# Patient Record
(demographics unavailable — no encounter records)

---

## 2024-10-18 NOTE — HISTORY OF PRESENT ILLNESS
[de-identified] : 84 year old man with Aortic stenosis, hyperlipidemia, hypertension, mitral regurgitation, CHELSI (off CPAP for one year), and remote h/o provoked DVT here for evaluation of erythrocytosis. Patient was previously following with Hematology- Dr. Kecia Rm at [facility] but she retired recently. He reports he was diagnosed with erythrocytosis over 5 years ago. In EMR, SANDOVAL 2 was reported as negative. He has been undergoing phlebotomy (250cc) every 6 weeks for about 5 years. His last phlebotomy was two months ago. As far as he recalls, the physician was keeping his HGB<17. He reports he tolerates the phlebotomy well without any ill side effects. He reports that he feels a bit sluggish and has some facial flushing when HGB/HCT are elevated. He reports that he had a sleep study in the past with mixed results. He was on CPAP but stopped one year ago because it did not improve his HGB/HCT. He denies any h/o smoking, pulmonary disease or testosterone use. He denies any h/o bone marrow biopsy. He is on Aspirin 81mg po daily.  He feels good overall. He reports a good energy level and appetite without recent weight loss. Patient denies any fever/chills, recent infections, CP, SOB, abdominal pain, early satiety, n/v/d, bloody/black stools, frequent headaches, dizziness, erythromelalgia, pruritus after a hot shower, swollen glands or night sweats.    Social History:  . Has four children.  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  Born in [place] Parents are from [place].    Family History:  Mother  97yo  Father  at 57 from [place]. Had first MI at age 39  Brother  at 34yo from [place]  Brother , unsure  4 children alive and well    Healthcare Maintenance:  Primary care doctor- Dr. Chidi Taveras- last seen 2023  Last colonoscopy> 5 years ago, normal  Denies endoscopy.  Denies recent chest x-ray or CT chest  Received three doses of COVID vaccine  History of COVID illness X 2. Last illness 1 year ago.       [de-identified] : Patient declined labs other than the FS for the CBC on the way into the appointment today.  Patient HCT 51.3% today, has not required a phlebotomy since March 2024.   Patient hand not made an appointment for phlebotomy today.  previous HCT was actually normal, we were not previously aware that he would even need a phlebotomy today. Now requites to have his chemistries and lipid profile drawn with the phlebotomy.   Can order a phlebotomy for today but informed him that there is a chance that there is no time on the schedule. I could make some phone calls to see if we can have this arrange.

## 2024-10-18 NOTE — ASSESSMENT
[FreeTextEntry1] : This is an 84 year old man with Aortic stenosis, hyperlipidemia, hypertension, mitral regurgitation, CHELSI (off CPAP for one year), and remote h/o provoked DVT here for evaluation of erythrocytosis. In EMR, SANDOVAL 2 was reported as negative. Though this did not includes Exon 12-15 mutations. Patient had never had a bone marrow biopsy done for pathologic confirmation of P vera. Repeated SANDOVAL 2 V617F, Exon 12-15 mutations, MPL and CALR mutations negative. 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  HCT did still rise to 51.3% today so we can see if we can make arrangements for an add on phlebotomy.  Patient now requests that his chemistries and lipid profile be checked along with the phlebotomy.    Spent > 40 minutes in direct patient care and  addressed all questions and concerns.  >50% of this time was in direct face to face contact with patient during exam and counseling and making arrangements for the additional phlebotomy that was not previously scheduled.  .

## 2025-01-06 NOTE — ASSESSMENT
[FreeTextEntry1] : This is an 84 year old man with Aortic stenosis, hyperlipidemia, hypertension, mitral regurgitation, CHELSI (off CPAP for one year), and remote h/o provoked DVT here for evaluation of erythrocytosis. In EMR, SANDOVAL 2 was reported as negative. Though this did not includes Exon 12-15 mutations. Patient had never had a bone marrow biopsy done for pathologic confirmation of P vera. Repeated SANDOVAL 2 V617F, Exon 12-15 mutations, MPL and CALR mutations negative. 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  We had proceeded with phlebotomy at patient request, HCT has been mostly well controlled, maximal HCT most recently in the 50-51% range.  Continue monthly phlebotomy for HCT > 50%.

## 2025-01-06 NOTE — HISTORY OF PRESENT ILLNESS
[de-identified] : On December 24th, PSA increased from 5.47 to 10.5.   HCT was 48% Hg 17.3g/dl that day on 12/24.  Todays still pending.      Patient is not seeing a urologist.  Saw Dr. Santiago in 2020.

## 2025-01-06 NOTE — HISTORY OF PRESENT ILLNESS
[de-identified] : On December 24th, PSA increased from 5.47 to 10.5.   HCT was 48% Hg 17.3g/dl that day on 12/24.  Todays still pending.      Patient is not seeing a urologist.  Saw Dr. Santiago in 2020.

## 2025-01-09 NOTE — PHYSICAL EXAM
[TextEntry] : General/Constitutional: WD/ WN in NAD H: NC/AT Eyes:  PERRL, sclerae and conjunctivae normal without jaundice or xanthelasma; ENMT:normal teeth, gums and palate with no petechiae, pallor or cyanosis Neck: w/o JVD, thyromegaly or adenopathy; normal venous contour Respiratory: clear to auscultation, normal respiratory effort with no retractions or use of accessory respiratory muscles Heart: [id number], regular rate with frequent ectopy, normal S1, S2 with a 3/6 mid peaking systolic murmur at the left upper sternal border but no rubs, gallops, heaves or thrills Vascular exam: normal carotid upstrokes without carotid or abdominal bruits. 2+/2+ pulses to posterior tibialis and dorsalis pedis Abdomen: soft, nontender, bowels sounds normal without hepatomegaly or splenomegaly, masses or bruits Musculoskeletal:without significant kyphosis or scoliosis Extremities: w/o CC; 1+ left ankle edema (chronic) and trace right ankle edema, good capillary filling Skin: no stasis changes; no ulcers  Neuro: AA and O x 3; no focal neurologic deficits Psych: normal mood and affect

## 2025-01-09 NOTE — REVIEW OF SYSTEMS
[TextEntry] : Except as noted above... Constitutional: The patient denied headache, fatigue, fever, sweats, loss of appetite or chills Eyes: The patient denied double vision, eye pain, eye discharge, red eyes or itchy eyes ENT: The patient denied ear pain, ear discharge, nasal congestion, nasal discharge, sore throat, enlarged tonsils, hoarseness, neck pain or neck swelling Cardiovascular: The patient denied  dizziness,  fainting  or leg cramps Respiratory: The patient denied shortness of breath, cough, coughing up blood, wheezing, chest congestion or mucous production GI: The patient denied weight gain, weight loss, abdominal pain, nausea, vomiting, diarrhea, constipation, black stools or bloody stools : The patient denied pain on urination, burning with urination, frequent urination or blood in the urine Skin: The patient denied rashes, redness or swelling Neurologic: The patient denied headache, stiff neck, weakness, numbness, difficulty speaking, unsteadiness or numbness/tingling in feet Psychiatric: The patient denied hallucinations, agitation or disorientation Endocrine: The patient denied excessive thirst, excessive urination, cold intolerance or heat intolerance Hematologic: The patient denied easy bruisability or pallor Allergic/Immunologic: The patient denied runny nose, recurrent infections, hives or pruritis Musculoskeletal: The patient denied arthritic pains, muscle weakness or muscle aches Extremities: The patient denied clubbing, cyanosis or lower extremity swelling

## 2025-01-09 NOTE — REASON FOR VISIT
[FreeTextEntry1] : The patient is here today for follow-up of hypertension, hypercholesterolemia, aortic stenosis, bilateral lower extremity edema and a rising PSA.  The patient's most recent blood test demonstrated PSA jumped from 5 to over 10.  His free PSA is 29%.  The patient does describe symptoms of BPH (frequent urination and nocturia).  He is scheduled to see a urologist in the near future for further evaluation.  From a cardiovascular standpoint, the patient is clinically stable.

## 2025-01-09 NOTE — DISCUSSION/SUMMARY
[FreeTextEntry1] : Hypertension-well-controlled on valsartan /25 daily.  Patient will continue checking blood pressures and will get back to me. Palpitations-resolved. Rising PSA-as above.  Patient will try super beta prostate to see whether BPH symptoms can improve.  Follow-up of elevated PSA as per urology. Polycythemia vera-as per [facility]. [facility], hyperlipidemia, high risk medication (fluconazole for dermatitis)-laboratory data drawn today Return visit 4 months   Total time of the encounter: 30 minutes which included but was not limited to the following: Face-to-face and non face-to-face time personally spent by the physician preparing to see the patient, obtaining and/or resuming separately obtained history, performing a medically appropriate examination and/or evaluation, counseling and educating the patient/family/caregiver, ordering medications, tests or procedures, referring and communicating with other healthcare professionals, documenting clinical information in the electronic health record, independently interpreting results and communicated results to the patient/family/caregiver and care coordination.  [EKG obtained to assist in diagnosis and management of assessed problem(s)] : EKG obtained to assist in diagnosis and management of assessed problem(s)

## 2025-02-06 NOTE — ASSESSMENT
[FreeTextEntry1] : This is an 84 year old man with Aortic stenosis, hyperlipidemia, hypertension, mitral regurgitation, CHELSI (off CPAP for one year), and remote h/o provoked DVT here for evaluation of erythrocytosis. In EMR, SANDOVAL 2 was reported as negative. Though this did not includes Exon 12-15 mutations. Patient had never had a bone marrow biopsy done for pathologic confirmation of P vera. Repeated SANDOVAL 2 V617F, Exon 12-15 mutations, MPL and CALR mutations negative. 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  Secondary erythrocytosis. Patient HCT 49% normal. Hg 17.8g/dl is a little high but not severely elevated. Continue phlebotomy treatments Q 2 months.  We had proceeded with phlebotomy at patient request, HCT has been mostly well controlled, maximal HCT most recently in the 50-51% range.  Continue monthly phlebotomy for HCT > 50%.

## 2025-02-06 NOTE — HISTORY OF PRESENT ILLNESS
[FreeTextEntry1] : Follow elevated PSA.  PSA is 10.  MRI demonstrated PI-RADS 4 lesion.  Consider biopsy.   Please refer to URO Consult Note.

## 2025-02-06 NOTE — HISTORY OF PRESENT ILLNESS
[de-identified] : 84 year old man with Aortic stenosis, hyperlipidemia, hypertension, mitral regurgitation, CHELSI (off CPAP for one year), and remote h/o provoked DVT here for evaluation of erythrocytosis. Patient was previously following with Hematology- Dr. Kecia Rm at [facility] but she retired recently. He reports he was diagnosed with erythrocytosis over 5 years ago. In EMR, SANDOVAL 2 was reported as negative. He has been undergoing phlebotomy (250cc) every 6 weeks for about 5 years. His last phlebotomy was two months ago. As far as he recalls, the physician was keeping his HGB<17. He reports he tolerates the phlebotomy well without any ill side effects. He reports that he feels a bit sluggish and has some facial flushing when HGB/HCT are elevated. He reports that he had a sleep study in the past with mixed results. He was on CPAP but stopped one year ago because it did not improve his HGB/HCT. He denies any h/o smoking, pulmonary disease or testosterone use. He denies any h/o bone marrow biopsy. He is on Aspirin 81mg po daily.  He feels good overall. He reports a good energy level and appetite without recent weight loss. Patient denies any fever/chills, recent infections, CP, SOB, abdominal pain, early satiety, n/v/d, bloody/black stools, frequent headaches, dizziness, erythromelalgia, pruritus after a hot shower, swollen glands or night sweats.    Social History:  . Has four children.  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  Born in [place] Parents are from [place].    Family History:  Mother  99yo  Father  at 57 from [place]. Had first MI at age 39  Brother  at 32yo from [place]  Brother , unsure  4 children alive and well    Healthcare Maintenance:  Primary care doctor- Dr. Chidi Taveras- last seen 2023  Last colonoscopy> 5 years ago, normal  Denies endoscopy.  Denies recent chest x-ray or CT chest  Received three doses of COVID vaccine  History of COVID illness X 2. Last illness 1 year ago.       [de-identified] : On December 24th, PSA increased from 5.47 to 10.5.   HCT was 48% Hg 17.3g/dl that day on 12/24.  Todays still pending.   Patient is not seeing a urologist.  Saw Dr. Santiago in 2020.  Patient now seeing Dr. Smith for the elevated PSA up to 10 now. Patient had an MRI hd a Prads 4 finding, probable malignancy, has a biopsy coming up next week. HCT now 49%.  Does not require phlebotomy, has needed one perhaps every 2 months or so lately.

## 2025-02-06 NOTE — HISTORY OF PRESENT ILLNESS
[de-identified] : 84 year old man with Aortic stenosis, hyperlipidemia, hypertension, mitral regurgitation, CHELSI (off CPAP for one year), and remote h/o provoked DVT here for evaluation of erythrocytosis. Patient was previously following with Hematology- Dr. Kecia Rm at [facility] but she retired recently. He reports he was diagnosed with erythrocytosis over 5 years ago. In EMR, SANDOVAL 2 was reported as negative. He has been undergoing phlebotomy (250cc) every 6 weeks for about 5 years. His last phlebotomy was two months ago. As far as he recalls, the physician was keeping his HGB<17. He reports he tolerates the phlebotomy well without any ill side effects. He reports that he feels a bit sluggish and has some facial flushing when HGB/HCT are elevated. He reports that he had a sleep study in the past with mixed results. He was on CPAP but stopped one year ago because it did not improve his HGB/HCT. He denies any h/o smoking, pulmonary disease or testosterone use. He denies any h/o bone marrow biopsy. He is on Aspirin 81mg po daily.  He feels good overall. He reports a good energy level and appetite without recent weight loss. Patient denies any fever/chills, recent infections, CP, SOB, abdominal pain, early satiety, n/v/d, bloody/black stools, frequent headaches, dizziness, erythromelalgia, pruritus after a hot shower, swollen glands or night sweats.    Social History:  . Has four children.  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  Born in [place] Parents are from [place].    Family History:  Mother  99yo  Father  at 57 from [place]. Had first MI at age 39  Brother  at 32yo from [place]  Brother , unsure  4 children alive and well    Healthcare Maintenance:  Primary care doctor- Dr. Chidi Taveras- last seen 2023  Last colonoscopy> 5 years ago, normal  Denies endoscopy.  Denies recent chest x-ray or CT chest  Received three doses of COVID vaccine  History of COVID illness X 2. Last illness 1 year ago.       [de-identified] : On December 24th, PSA increased from 5.47 to 10.5.   HCT was 48% Hg 17.3g/dl that day on 12/24.  Todays still pending.   Patient is not seeing a urologist.  Saw Dr. Santiago in 2020.  Patient now seeing Dr. Smith for the elevated PSA up to 10 now. Patient had an MRI hd a Prads 4 finding, probable malignancy, has a biopsy coming up next week. HCT now 49%.  Does not require phlebotomy, has needed one perhaps every 2 months or so lately.

## 2025-02-06 NOTE — LETTER BODY
[FreeTextEntry1] : Reason for Visit: Elevated PSA.  This is a 84 year-old gentleman with elevated PSA. Patient reports that he has not had previous prostate biopsy. His current PSA is 10. Patient returns for follow-up. Since he was last seen, patient had prostate MRI which demonstrated a PI-RADS 4 MRI lesion. Patient denies any hematuria or lower urinary tract symptoms. He denies any pain. The patient denies any aggravating or relieving factors. The patient denies any interference of function. The patient is entirely asymptomatic. All other review of systems are negative. He has no cancer in his family medical history. He has no previous surgical history. Past medical history, family history and social history were inquired and were noncontributory to current condition. The patient does not use tobacco or drink alcohol. Medications and allergies were reviewed. He has no known allergies to medication.    On examination, the patient is a older-appearing gentleman in no acute distress. He is alert and oriented follows commands. He has normal mood and affect. He is normocephalic. Neck is supple. Oral no thrush. Respirations are unlabored. His abdomen is soft and nontender. Bladder is nonpalpable. No CVA tenderness. Neurologically he is grossly intact. No peripheral edema. Skin without gross abnormality.    I personally reviewed prostate MRI images with the patient today and images demonstrated a PI-RADS 4 lesion.      Assessment: Elevated PSA.    I counseled the patient. Patient's MRI demonstrated a PI-RADS 4 lesion, high likelihood of prostate cancer. I discussed with him the risk of occult malignancy. I had a lengthy discussion with the patient regarding his options.  Patient wishes to proceed with prostate biopsy to confirm current discomfort management.  The option of surveillance of PSA was discussed also.  Patient declined repeating PSA in 6 months.  He wishes to proceed with fusion biopsy to rule out prostate cancer. Risks and alternatives were discussed. I answered the patient questions. He will take the necessary preparations for the procedure, including fleet enema. The patient will follow-up as directed and will contact me with any questions or concerns. Thank you for the opportunity to participate in the care of Mr. DEL CID. I will keep you updated on his progress.    Plan: Consider PSA surveillance versus fusion targeted prostate biopsy. Follow-up as directed.

## 2025-02-06 NOTE — ADDENDUM
[FreeTextEntry1] : Entered by Rah Piper, acting as scribe for Dr. Nacho Smith. The documentation recorded by the scribe accurately reflects the service I personally performed and the decisions made by me.

## 2025-02-13 NOTE — HISTORY OF PRESENT ILLNESS
[FreeTextEntry1] :   Follow-up positive prostate biopsy.  Biopsy demonstrated [place] 9 and [place] 8 prostate cancer and 7/15 cores.    Plan PET/CT.  Referral to radiation collagen.   Please refer to URO Consult Note.

## 2025-02-13 NOTE — LETTER BODY
[FreeTextEntry1] :   Reason for visit: Prostate cancer.   This is a 84 year old gentleman with elevated PSA, status post prostate biopsy. Patient had prostate MRI which demonstrated a PI-RADS 4 MRI lesion. The patient returns today to discuss the results of the biopsy. He denies any side effects or difficulties from the prostate biopsy. He denies any fevers or chills. He denies any pain. Patient denies any changes in health. The past medical history and family history and social history are unchanged. All other review of systems are negative. Patient denies any changes in medications. Medication list was reconciled.     On examination, the patient is a healthy-appearing gentleman in no acute distress. He is alert and oriented follows commands. He has normal mood and affect. He is normocephalic. Neck is supple. Oral no thrush Respirations are unlabored. His abdomen is soft and nontender. Bladder is nonpalpable. No CVA tenderness. Neurologically he is grossly intact. No peripheral edema. Skin without gross abnormality. He has normal male external genitalia. Normal meatus. Bilateral testes are descended intrascrotally and normal to palpation. On rectal examination, there is normal sphincter tone. The prostate is clinically benign without focal induration or nodularity.     Prostate biopsy demonstrated evidence of Wallace 9 and Wallace 8 adenocarcinoma of the prostate involving 7 of 15 cores.     Assessment: Prostate cancer     I counseled the patient on its clinical significance. I discussed the risk of metastatic disease and the probability of organ confined disease. I discussed the treatment options available to him including expectant management, hormonal therapy, radiation, and surgery. The risks and benefits of each options were discussed in detail as well quality of life issues. I answered his questions. I recommended patient obtains PET/CT scan to evaluate for metastasis. He will also follow-up with radiation oncology for further evaluation.  Risks and alternatives were discussed. I answered the patient's questions. The patient will follow-up as directed and will contact me with any questions or concerns. Thank you for the opportunity to participate in the care of this patient, I will keep you updated on his progress.     Plan: PET/CT scan. Referral to radiation oncology. Follow-up in 1 month.

## 2025-02-13 NOTE — HISTORY OF PRESENT ILLNESS
[FreeTextEntry1] :   Follow-up positive prostate biopsy.  Biopsy demonstrated [place] 9 and [place] 8 prostate cancer and 7/15 cores.    Plan PET/CT.  Referral to radiation collagen.   Please refer to URO Consult Note.

## 2025-02-24 NOTE — VITALS
[Maximal Pain Intensity: 0/10] : 0/10 [Least Pain Intensity: 0/10] : 0/10 [NoTreatment Scheduled] : no treatment scheduled [90: Able to carry normal activity; minor signs or symptoms of disease.] : 90: Able to carry normal activity; minor signs or symptoms of disease.  [ECOG Performance Status: 1 - Restricted in physically strenuous activity but ambulatory and able to carry out work of a light or sedentary nature] : Performance Status: 1 - Restricted in physically strenuous activity but ambulatory and able to carry out work of a light or sedentary nature, e.g., light house work, office work [Date: ____________] : Patient's last distress assessment performed on [unfilled]. [6 - Distress Level] : Distress Level: 6 [Referred Patient  to social work for follow-up] : Patient was referred to social work for follow-up

## 2025-02-24 NOTE — DISEASE MANAGEMENT
[0-10] : 0 -10 ng/mL [Biopsy with Fusion] : Patient had a biopsy with fusion on [9] : Template Biopsy Wallace Score: 9 [8] : Fusion Biopsy Wallace Score: 8 [] : Patient had a Prostate MRI [4] : 4 [c] : c [0] : M0 [IIIC] : IIIC [2] : T2 [BiopsyDate] : 2/10/2025 [MeasuredProstateVolume] : 60 [TotalCores] : 15 [TotalPositiveCores] : 7 [MaxCoreInvolvement] : 30

## 2025-02-24 NOTE — HISTORY OF PRESENT ILLNESS
[FreeTextEntry1] : Mr. Christine is an 84-year-old male who presents in consultation for consideration of radiation therapy.   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 Embarrass score 4+5=9 in one site/core with intraductal carcinoma of prostate present.  Wallace score 4+4=8 in 2 sites/3 cores including the MRI target lesion. Embarrass score 3+4=7 in 1 site/core with perineural invasion identified. Embarrass score 3+3=6 in 2 sites/2 cores. 30% max involvement.  PSA 10.50 ng/mL. MRI T2, no ELLA or SV involvement. PSMA PET No PSMA avid nodes or lymphadenopathy, no metastatic disease noted.    PSA Trend: 3/26/19 - 3.14 ng/mL 9/2/20 - 4.19 5/10/22 - 5.47 12/24/24 - 10.50 1/16/25 - 10.00   HPI: Mr. Christine presented to Dr. Smith (urology) in January 2025 for evaluation of elevated PSA.  He had no history of previous MRI or prostate biopsy in the past.   He has past medical history of erythrocytosis (follows with Dr. Young - hematology, undergoes periodic phlebotomies), DVT, HLD, HTN, and aortic stenosis.   1/10/25 - Prostate MRI:  Prostate volume 60 cc. PSA density: 0.17 ng/mL/mL. Lesion #1, Right, posterior medial (PZpm), midgland, peripheral zone (9 x 4 x 5 mm). Tumor abuts but does not deform capsule. No evidence of neurovascular bundle invasion, no seminal vesicle invasion, no pelvic adenopathy seen, and no suspicious bone lesions identified.  PIRADS 4   2/10/25 - underwent Fusion Prostate Biopsy:  Pathology - Adenocarcinoma of Prostate, 6/13 sites and 7/15 cores involved. Wallace score 4+5=9 in one site/core with intraductal carcinoma of prostate present.  Embarrass score 4+4=8 in 2 sites/3 cores including the MRI target lesion. Wallace score 3+4=7 in 1 site/core with perineural invasion identified. Embarrass score 3+3=6 in 2 sites/2 cores. 30% max involvement.  One benign core with small focus of atypical glands and intraductal carcinoma of the prostate. NOTE: Cribriform Wallace pattern 4 is present. Intraductal carcinoma component is not incorporated into GG.   2/13/25 - saw Dr. Smith (urology) in follow up.  Discussed imaging and biopsy results. Also discussed ordering PSMA PET scan to evaluate for metastasis. Referral to radiation oncology made.   2/14/25 - PSMA PET:  PSMA-intense RIGHT prostate mid body to apical lesion, (Expression score 2- Intermediate.) This correlates to the PI-RADS 4 lesion described on 1/20/2025 MR pelvis. Additional radiotracer intense foci in the left peripheral gland at the mid body is PSMA Expression Grade 2, Intermediate. A low PSMA expression Grade 1 foci is noted within the right posterior apex of the prostate. No PSMA avid nodes or lymphadenopathy.  2/24/25 - presents in consultation for discussion of radiation therapy options.  He was accompanied by his daughter. He is fairly healthy for his age, active but stopped walking after developing sciatic pain in his leg. He noticed a change in his urinary habits which prompted PSA screening. He complains of increased frequency, urgency and a weak stream. He is taking Flomax which helps somewhat but due to side effects he is not taking a higher dose. He has no bowel symptoms, and he is not sexually active but finds it of a small problem currently. He sees Dr Smith and following a cardiologist for his heart condition. IPSS 10, EPIC 8. Last colonoscopy was 10 years back. he was advised to do FOBT but the patient refused to do the testing.

## 2025-02-24 NOTE — REVIEW OF SYSTEMS
[Nocturia] : nocturia [Urinary Frequency] : urinary frequency [IPSS Score (0-40): ___] : IPSS score: [unfilled] [EPIC-CP Score (0-60): ___] : EPIC-CP score: [unfilled] [Muscle Pain] : muscle pain [Difficulty Walking] : difficulty walking [Negative] : Psychiatric [Anal Pain: Grade 0] : Anal Pain: Grade 0 [Constipation: Grade 0] : Constipation: Grade 0 [Diarrhea: Grade 0] : Diarrhea: Grade 0 [Fecal Incontinence: Grade 0] : Fecal Incontinence: Grade 0 [Nausea: Grade 0] : Nausea: Grade 0 [Rectal Pain: Grade 0] : Rectal Pain: Grade 0 [Hematuria: Grade 0] : Hematuria: Grade 0 [Urinary Incontinence: Grade 0] : Urinary Incontinence: Grade 0  [Urinary Retention: Grade 0] : Urinary Retention: Grade 0 [Urinary Tract Pain: Grade 0] : Urinary Tract Pain: Grade 0 [Urinary Urgency: Grade 1 - Present] : Urinary Urgency: Grade 1 - Present [Urinary Frequency: Grade 1 - Present] : Urinary Frequency: Grade 1 - Present [Ejaculation Disorder: Grade 0] : Ejaculation Disorder: Grade 0 [Erectile Dysfunction: Grade 1 - Decrease in erectile function (frequency or rigidity of erections) but intervention not indicated (e.g., medication or use of mechanical device, penile pump)] : Erectile Dysfunction: Grade 1 - Decrease in erectile function (frequency or rigidity of erections) but intervention not indicated (e.g., medication or use of mechanical device, penile pump) [Chest Pain] : no chest pain [Palpitations] : no palpitations [Lower Ext Edema] : no lower extremity edema [Vomiting] : no vomiting [Constipation] : no constipation [Diarrhea] : no diarrhea [Muscle Weakness] : no muscle weakness [Confused] : no confusion [Dizziness] : no dizziness [Fainting] : no fainting [de-identified] : He has sciatic pain in the right leg.

## 2025-02-24 NOTE — PHYSICAL EXAM
[General Appearance - Well Developed] : well developed [General Appearance - Well Nourished] : well nourished [] : no respiratory distress [Heart Sounds] : normal S1 and S2 [No Focal Deficits] : no focal deficits [Oriented To Time, Place, And Person] : oriented to person, place, and time

## 2025-02-24 NOTE — REVIEW OF SYSTEMS
[Nocturia] : nocturia [Urinary Frequency] : urinary frequency [IPSS Score (0-40): ___] : IPSS score: [unfilled] [EPIC-CP Score (0-60): ___] : EPIC-CP score: [unfilled] [Muscle Pain] : muscle pain [Difficulty Walking] : difficulty walking [Negative] : Psychiatric [Anal Pain: Grade 0] : Anal Pain: Grade 0 [Constipation: Grade 0] : Constipation: Grade 0 [Diarrhea: Grade 0] : Diarrhea: Grade 0 [Fecal Incontinence: Grade 0] : Fecal Incontinence: Grade 0 [Nausea: Grade 0] : Nausea: Grade 0 [Rectal Pain: Grade 0] : Rectal Pain: Grade 0 [Hematuria: Grade 0] : Hematuria: Grade 0 [Urinary Incontinence: Grade 0] : Urinary Incontinence: Grade 0  [Urinary Retention: Grade 0] : Urinary Retention: Grade 0 [Urinary Tract Pain: Grade 0] : Urinary Tract Pain: Grade 0 [Urinary Urgency: Grade 1 - Present] : Urinary Urgency: Grade 1 - Present [Urinary Frequency: Grade 1 - Present] : Urinary Frequency: Grade 1 - Present [Ejaculation Disorder: Grade 0] : Ejaculation Disorder: Grade 0 [Erectile Dysfunction: Grade 1 - Decrease in erectile function (frequency or rigidity of erections) but intervention not indicated (e.g., medication or use of mechanical device, penile pump)] : Erectile Dysfunction: Grade 1 - Decrease in erectile function (frequency or rigidity of erections) but intervention not indicated (e.g., medication or use of mechanical device, penile pump) [Chest Pain] : no chest pain [Palpitations] : no palpitations [Lower Ext Edema] : no lower extremity edema [Vomiting] : no vomiting [Constipation] : no constipation [Diarrhea] : no diarrhea [Muscle Weakness] : no muscle weakness [Confused] : no confusion [Dizziness] : no dizziness [Fainting] : no fainting [de-identified] : He has sciatic pain in the right leg.

## 2025-02-24 NOTE — PHYSICAL EXAM
[General Appearance - Well Developed] : well developed [] : no respiratory distress [General Appearance - Well Nourished] : well nourished [Heart Sounds] : normal S1 and S2 [No Focal Deficits] : no focal deficits [Oriented To Time, Place, And Person] : oriented to person, place, and time

## 2025-02-24 NOTE — HISTORY OF PRESENT ILLNESS
[FreeTextEntry1] : Mr. Christine is an 84-year-old male who presents in consultation for consideration of radiation therapy.   Diagnosis:  High Risk Adenocarcinoma of Prostate Stage IIIc [id number], 6/13 sites and 7/15 cores involved. Montrose score 4+5=9 in one site/core with intraductal carcinoma of prostate present.  Wallace score 4+4=8 in 2 sites/3 cores including the MRI target lesion. Montrose score 3+4=7 in 1 site/core with perineural invasion identified. Montrose score 3+3=6 in 2 sites/2 cores. 30% max involvement.  PSA 10.50 ng/mL. MRI T2, no ELLA or SV involvement. PSMA PET No PSMA avid nodes or lymphadenopathy, no metastatic disease noted.    PSA Trend: 3/26/19 - 3.14 ng/mL 9/2/20 - 4.19 5/10/22 - 5.47 12/24/24 - 10.50 1/16/25 - 10.00   HPI: Mr. Christine presented to Dr. Smith (urology) in January 2025 for evaluation of elevated PSA.  He had no history of previous MRI or prostate biopsy in the past.   He has past medical history of erythrocytosis (follows with Dr. Young - hematology, undergoes periodic phlebotomies), DVT, HLD, HTN, and aortic stenosis.   1/10/25 - Prostate MRI:  Prostate volume 60 cc. PSA density: 0.17 ng/mL/mL. Lesion #1, Right, posterior medial (PZpm), midgland, peripheral zone (9 x 4 x 5 mm). Tumor abuts but does not deform capsule. No evidence of neurovascular bundle invasion, no seminal vesicle invasion, no pelvic adenopathy seen, and no suspicious bone lesions identified.  PIRADS 4   2/10/25 - underwent Fusion Prostate Biopsy:  Pathology - Adenocarcinoma of Prostate, 6/13 sites and 7/15 cores involved. Wallace score 4+5=9 in one site/core with intraductal carcinoma of prostate present.  Montrose score 4+4=8 in 2 sites/3 cores including the MRI target lesion. Wallace score 3+4=7 in 1 site/core with perineural invasion identified. Montrose score 3+3=6 in 2 sites/2 cores. 30% max involvement.  One benign core with small focus of atypical glands and intraductal carcinoma of the prostate. NOTE: Cribriform Wallace pattern 4 is present. Intraductal carcinoma component is not incorporated into GG.   2/13/25 - saw Dr. Smith (urology) in follow up.  Discussed imaging and biopsy results. Also discussed ordering PSMA PET scan to evaluate for metastasis. Referral to radiation oncology made.   2/14/25 - PSMA PET:  PSMA-intense RIGHT prostate mid body to apical lesion, (Expression score 2- Intermediate.) This correlates to the PI-RADS 4 lesion described on 1/20/2025 MR pelvis. Additional radiotracer intense foci in the left peripheral gland at the mid body is PSMA Expression Grade 2, Intermediate. A low PSMA expression Grade 1 foci is noted within the right posterior apex of the prostate. No PSMA avid nodes or lymphadenopathy.  2/24/25 - presents in consultation for discussion of radiation therapy options.  He was accompanied by his daughter. He is fairly healthy for his age, active but stopped walking after developing sciatic pain in his leg. He noticed a change in his urinary habits which prompted PSA screening. He complains of increased frequency, urgency and a weak stream. He is taking Flomax which helps somewhat but due to side effects he is not taking a higher dose. He has no bowel symptoms, and he is not sexually active but finds it of a small problem currently. He sees Dr Smith and following a cardiologist for his heart condition. IPSS 10, EPIC 8. Last colonoscopy was 10 years back. he was advised to do FOBT but the patient refused to do the testing.

## 2025-03-10 NOTE — PHYSICAL EXAM
[TextEntry] : General/Constitutional: WD/ WN in NAD H: NC/AT Eyes:  PERRL, sclerae and conjunctivae normal without jaundice or xanthelasma; ENMT:normal teeth, gums and palate with no petechiae, pallor or cyanosis Neck: w/o JVD, thyromegaly or adenopathy; normal venous contour Respiratory: clear to auscultation, normal respiratory effort with no retractions or use of accessory respiratory muscles Heart: [id number], regular rate with frequent ectopy, normal S1, S2 with a 3/6 mid peaking systolic murmur at the left upper sternal border but no rubs, gallops, heaves or thrills Vascular exam: normal carotid upstrokes without carotid or abdominal bruits. 2+/2+ pulses to posterior tibialis and dorsalis pedis Abdomen: soft, nontender, bowels sounds normal without hepatomegaly or splenomegaly, masses or bruits Musculoskeletal:without significant kyphosis or scoliosis Extremities: w/o CC; 1-2+ bilateral ankle edema (left greater than right), good capillary filling Skin: no stasis changes; no ulcers  Neuro: AA and O x 3; no focal neurologic deficits Psych: normal mood and affect

## 2025-03-10 NOTE — REASON FOR VISIT
[FreeTextEntry1] : The patient is here today for follow-up of hypertension, hypercholesterolemia, aortic stenosis, bilateral lower extremity edema and a newly diagnosed prostate carcinoma.  The patient has been following up with urology and will be proceeding with radiation therapy and hormone deprivation therapy through [facility] and [facility].  Except for ongoing difficulties with anxiety related to this new diagnosis, the patient is doing well.  When he does have anxiety, he takes half a Xanax which seems to control his symptoms well.  The patient has been monitoring his heart rhythm with his Apple Watch.  Recently, he had an episode of palpitations which was interpreted by the Apple Watch as atrial fibrillation.  I reviewed the strips with the patient and in fact it shows normal sinus rhythm with frequent atrial premature beats.  I reassured the patient this was not a cause for concern.  The patient had been on tamsulosin but found that it was making his blood pressures too labile.  He stopped tamsulosin without any significant changes in LUTS.

## 2025-03-10 NOTE — DISCUSSION/SUMMARY
[FreeTextEntry1] : Hypertension-well-controlled on valsartan /25 daily.  Patient will continue checking blood pressures and will get back to me. Palpitations-resolved. Rising PSA-as above.  Patient will try super beta prostate to see whether BPH symptoms can improve.  Follow-up of elevated PSA as per urology. Polycythemia vera-as per Dr. [facility], hyperlipidemia, high risk medication (fluconazole for dermatitis)-laboratory data drawn today Return visit 4 months   Total time of the encounter: 30 minutes which included but was not limited to the following: Face-to-face and non face-to-face time personally spent by the physician preparing to see the patient, obtaining and/or resuming separately obtained history, performing a medically appropriate examination and/or evaluation, counseling and educating the patient/family/caregiver, ordering medications, tests or procedures, referring and communicating with other healthcare professionals, documenting clinical information in the electronic health record, independently interpreting results and communicated results to the patient/family/caregiver and care coordination.  [EKG obtained to assist in diagnosis and management of assessed problem(s)] : EKG obtained to assist in diagnosis and management of assessed problem(s)

## 2025-07-02 NOTE — HISTORY OF PRESENT ILLNESS
[FreeTextEntry1] : 85-year-old male with a history of moderate aortic stenosis, hypertension, hypercholesterolemia, CAD, prostate cancer, obesity.  He is a patient of Dr. Parker and was last seen 3/25.  He is here today in preop evaluation prior to having surgery to implant a shield prior to starting external radiation for his prostate cancer.  The procedure is scheduled for 7/15.  He is feeling generally well at present.  He has some decrease in energy from antiandrogen therapy.  He denies any chest pressure, shortness of breath, or other cardiac symptoms.

## 2025-07-02 NOTE — DISCUSSION/SUMMARY
[FreeTextEntry1] : Mr Christine has no current complaints and looks unchanged.  His exam shows regular rhythm, normal blood pressure, clear lungs, BMI of 30, normal cardiac exam, 1+ peripheral edema.  His EKG shows left axis deviation and poor R wave progression which have been noted in the past.  Blood work was drawn and will copy this report.    He is stable and healthy.  He is an acceptable surgical candidate and is cleared to proceed as scheduled.  He will continue on aspirin, rosuvastatin 5, valsartan/hydrochlorothiazide 160/12.5.  He will follow-up according to his regular schedule.  angélica [EKG obtained to assist in diagnosis and management of assessed problem(s)] : EKG obtained to assist in diagnosis and management of assessed problem(s)